# Patient Record
Sex: FEMALE | URBAN - METROPOLITAN AREA
[De-identification: names, ages, dates, MRNs, and addresses within clinical notes are randomized per-mention and may not be internally consistent; named-entity substitution may affect disease eponyms.]

---

## 2021-07-02 ENCOUNTER — EMERGENCY (EMERGENCY)
Facility: HOSPITAL | Age: 61
LOS: 1 days | Discharge: HOME | End: 2021-07-02
Attending: EMERGENCY MEDICINE
Payer: COMMERCIAL

## 2021-07-02 VITALS
HEART RATE: 85 BPM | RESPIRATION RATE: 20 BRPM | DIASTOLIC BLOOD PRESSURE: 78 MMHG | OXYGEN SATURATION: 98 % | SYSTOLIC BLOOD PRESSURE: 166 MMHG | TEMPERATURE: 98 F

## 2021-07-02 VITALS
DIASTOLIC BLOOD PRESSURE: 98 MMHG | TEMPERATURE: 98 F | RESPIRATION RATE: 19 BRPM | HEIGHT: 67 IN | SYSTOLIC BLOOD PRESSURE: 177 MMHG | WEIGHT: 179.9 LBS | OXYGEN SATURATION: 97 % | HEART RATE: 83 BPM

## 2021-07-02 PROCEDURE — 73060 X-RAY EXAM OF HUMERUS: CPT | Mod: 26,RT

## 2021-07-02 PROCEDURE — 73060 X-RAY EXAM OF HUMERUS: CPT

## 2021-07-02 PROCEDURE — 73090 X-RAY EXAM OF FOREARM: CPT | Mod: 26,LT

## 2021-07-02 PROCEDURE — 73080 X-RAY EXAM OF ELBOW: CPT

## 2021-07-02 PROCEDURE — 73080 X-RAY EXAM OF ELBOW: CPT | Mod: 26,RT

## 2021-07-02 PROCEDURE — 73562 X-RAY EXAM OF KNEE 3: CPT

## 2021-07-02 PROCEDURE — 99284 EMERGENCY DEPT VISIT MOD MDM: CPT

## 2021-07-02 PROCEDURE — 73090 X-RAY EXAM OF FOREARM: CPT

## 2021-07-02 PROCEDURE — 99284 EMERGENCY DEPT VISIT MOD MDM: CPT | Mod: 25

## 2021-07-02 PROCEDURE — 73562 X-RAY EXAM OF KNEE 3: CPT | Mod: 26,RT

## 2021-07-02 RX ORDER — ACETAMINOPHEN 500 MG
975 TABLET ORAL ONCE
Refills: 0 | Status: COMPLETED | OUTPATIENT
Start: 2021-07-02 | End: 2021-07-02

## 2021-07-02 RX ADMIN — Medication 975 MILLIGRAM(S): at 14:36

## 2021-07-02 NOTE — ED PROVIDER NOTE - NSFOLLOWUPINSTRUCTIONS_ED_ALL_ED_FT
You were found to have a proximal radial head fracture in the Emergency room today.    The right elbow was wrapped and placed in a sling.     Please follow up with Orthopedic Surgery within 48-72 hours.    Read RADIAL HEAD FRACTURE information sheet.    Please return to ED for any new concerning symptoms.

## 2021-07-02 NOTE — ED PROVIDER NOTE - CARE PLAN
Principal Discharge DX:	Closed nondisplaced fracture of head of right radius, initial encounter   Principal Discharge DX:	Closed nondisplaced fracture of head of right radius, initial encounter  Secondary Diagnosis:	Knee contracture, right

## 2021-07-02 NOTE — ED PROVIDER NOTE - NSFOLLOWUPCLINICS_GEN_ALL_ED_FT
[facility]  Orthopedic Surgery  [address] [address]  Phone: [phone]  Fax:

## 2021-07-02 NOTE — ED ADULT NURSE NOTE - NSFALLRSKINDICATORS_ED_ALL_ED
From: Stacey Garcia  Sent: 1/8/2018 11:52 AM EST  Subject: Medication Renewal Request    Stacey Garcia would like a refill of the following medications:  amphetamine-dextroamphetamine (ADDERALL, 20MG,) 20 MG tablet Valeria Bryan MD]    Preferred pharmacy: [facility] [address] [place] [address]  [phone] - F [phone]    Comment: no

## 2021-07-02 NOTE — ED ADULT NURSE NOTE - OBJECTIVE STATEMENT
59 yo F c/o of falling over boxes, and injuring R arm. Also c/o of b/l knee pain. No sign of deformity. Pt able to move all extremities. No loc, no head trauma. Provider at bedside evaluating.

## 2021-07-02 NOTE — ED PROVIDER NOTE - MUSCULOSKELETAL MINIMAL EXAM
R elbow/forearm ttp; limited ROM 2/2 pain; R knee ttp w/ abrasion and palpable effusion; no spinal tenderness

## 2021-07-02 NOTE — ED ADULT NURSE NOTE - DATE OF LAST VACCINATION
Attempted to see patient but he is off the floor for neurosurgery. Of note- metformin restarted but patient currently NPO and therefore at risk for hypoglycemia. Will check back as time allows.     Rizwana Larry MD 20-Jan-2021

## 2021-07-02 NOTE — ED PROVIDER NOTE - NSPTACCESSSVCSAPPTDETAILS_ED_ALL_ED_FT
URGENT orthopedic follow up for proximal radial head fracture; ace bandaged and placed in sling for comfort

## 2021-07-02 NOTE — ED PROVIDER NOTE - PATIENT PORTAL LINK FT
You can access the FollowMyHealth Patient Portal offered by [facility] by registering at the following website: [email or web address]. By joining [facility]’s FollowMyHealth portal, you will also be able to view your health information using other applications (apps) compatible with our system.

## 2021-07-02 NOTE — ED PROVIDER NOTE - ATTENDING CONTRIBUTION TO CARE
------------ATTENDING NOTE------------  RHD pt w/ son c/o mechanical trip/fall today, landed on R elbow/forearm, no head injury or LOC/HA, no neck/back pain, (+)ambulatory at scene, c/o moderate ache and slight swelling to R elbow/forearm, worse w/ ROM and slightly limited full extension, soft compartments, nvi w/ bcr distally, normal neuro exam, CTL spine clinically cleared, stable chest w/o resp distress, soft benign abd, no open wounds, awaiting imaging and close reassessments -->  - George Woodall MD   --------------------------------------------- ------------ATTENDING NOTE------------  RHD pt w/ son c/o mechanical trip/fall today, landed on R elbow/forearm, no head injury or LOC/HA, no neck/back pain, (+)ambulatory at scene, c/o moderate ache and slight swelling to R elbow/forearm, worse w/ ROM and slightly limited full extension, soft compartments, nvi w/ bcr distally, normal neuro exam, CTL spine clinically cleared, stable chest w/o resp distress, soft benign abd, no open wounds, awaiting imaging and close reassessments --> c/w radial head fx on R, benign repeat exams, in sling, nml VS at dc, no additional complaints, in depth dw pt/son about ddx, tx, titus, continued close outpt fu.  - George Woodall MD   ---------------------------------------------

## 2021-07-02 NOTE — ED PROVIDER NOTE - OBJECTIVE STATEMENT
59 y/o F, no pertinent PMH, presents to ED c/o R elbow/forearm pain s/p mechanical fall 61 y/o F, no pertinent PMH, presents to ED c/o R elbow/forearm pain s/p mechanical fall. Pt denies head injury or LOC. Pt was ambulatory following the fall. Pt denies f/c, CP, SOB, abd pain, n/v/d, back pain, HA, neck pain, urinary sx, weakness, numbness, or any other symptoms at this time.

## 2021-12-25 NOTE — ED PROVIDER NOTE - RESPIRATORY, MLM
PAST SURGICAL HISTORY:  No significant past surgical history Breath sounds clear and equal bilaterally.

## 2023-01-13 NOTE — ED PROVIDER NOTE - NS ED MD DISPO DISCHARGE CCDA
Patient/Caregiver provided printed discharge information. Normal vision: sees adequately in most situations; can see medication labels, newsprint
